# Patient Record
Sex: FEMALE | Race: BLACK OR AFRICAN AMERICAN | NOT HISPANIC OR LATINO | URBAN - METROPOLITAN AREA
[De-identification: names, ages, dates, MRNs, and addresses within clinical notes are randomized per-mention and may not be internally consistent; named-entity substitution may affect disease eponyms.]

---

## 2019-10-21 ENCOUNTER — OFFICE (OUTPATIENT)
Dept: URBAN - METROPOLITAN AREA CLINIC 11 | Facility: CLINIC | Age: 59
End: 2019-10-21

## 2019-10-21 VITALS
WEIGHT: 103 LBS | HEART RATE: 70 BPM | DIASTOLIC BLOOD PRESSURE: 79 MMHG | HEIGHT: 62 IN | SYSTOLIC BLOOD PRESSURE: 153 MMHG

## 2019-10-21 DIAGNOSIS — K80.50 CALCULUS OF BILE DUCT WITHOUT CHOLANGITIS OR CHOLECYSTITIS W: ICD-10-CM

## 2019-10-21 DIAGNOSIS — K83.9 DISEASE OF BILIARY TRACT, UNSPECIFIED: ICD-10-CM

## 2019-10-21 DIAGNOSIS — K83.1 OBSTRUCTION OF BILE DUCT: ICD-10-CM

## 2019-10-21 DIAGNOSIS — Z80.0 FAMILY HISTORY OF MALIGNANT NEOPLASM OF DIGESTIVE ORGANS: ICD-10-CM

## 2019-10-21 LAB
CBC, PLATELET, NO DIFFERENTIAL: HEMATOCRIT: 37.8 % (ref 34–46.6)
CBC, PLATELET, NO DIFFERENTIAL: HEMOGLOBIN: 11.8 G/DL (ref 11.1–15.9)
CBC, PLATELET, NO DIFFERENTIAL: MCH: 27.3 PG (ref 26.6–33)
CBC, PLATELET, NO DIFFERENTIAL: MCHC: 31.2 G/DL — LOW (ref 31.5–35.7)
CBC, PLATELET, NO DIFFERENTIAL: MCV: 87 FL (ref 79–97)
CBC, PLATELET, NO DIFFERENTIAL: PLATELETS: 221 X10E3/UL (ref 150–450)
CBC, PLATELET, NO DIFFERENTIAL: RBC: 4.33 X10E6/UL (ref 3.77–5.28)
CBC, PLATELET, NO DIFFERENTIAL: RDW: 14.4 % (ref 12.3–15.4)
CBC, PLATELET, NO DIFFERENTIAL: WBC: 5.9 X10E3/UL (ref 3.4–10.8)
COMP. METABOLIC PANEL (14): A/G RATIO: 2.1 (ref 1.2–2.2)
COMP. METABOLIC PANEL (14): ALBUMIN: 4.5 G/DL (ref 3.6–4.8)
COMP. METABOLIC PANEL (14): ALKALINE PHOSPHATASE: 86 IU/L (ref 39–117)
COMP. METABOLIC PANEL (14): ALT (SGPT): 9 IU/L (ref 0–32)
COMP. METABOLIC PANEL (14): AST (SGOT): 16 IU/L (ref 0–40)
COMP. METABOLIC PANEL (14): BILIRUBIN, TOTAL: <0.2 MG/DL
COMP. METABOLIC PANEL (14): BUN/CREATININE RATIO: 8 — LOW (ref 12–28)
COMP. METABOLIC PANEL (14): BUN: 7 MG/DL — LOW (ref 8–27)
COMP. METABOLIC PANEL (14): CALCIUM: 9.3 MG/DL (ref 8.7–10.3)
COMP. METABOLIC PANEL (14): CARBON DIOXIDE, TOTAL: 22 MMOL/L (ref 20–29)
COMP. METABOLIC PANEL (14): CHLORIDE: 107 MMOL/L — HIGH (ref 96–106)
COMP. METABOLIC PANEL (14): CREATININE: 0.89 MG/DL (ref 0.57–1)
COMP. METABOLIC PANEL (14): EGFR IF AFRICN AM: 81 ML/MIN/1.73 (ref 59–?)
COMP. METABOLIC PANEL (14): EGFR IF NONAFRICN AM: 70 ML/MIN/1.73 (ref 59–?)
COMP. METABOLIC PANEL (14): GLOBULIN, TOTAL: 2.1 G/DL (ref 1.5–4.5)
COMP. METABOLIC PANEL (14): GLUCOSE: 84 MG/DL (ref 65–99)
COMP. METABOLIC PANEL (14): POTASSIUM: 4.1 MMOL/L (ref 3.5–5.2)
COMP. METABOLIC PANEL (14): PROTEIN, TOTAL: 6.6 G/DL (ref 6–8.5)
COMP. METABOLIC PANEL (14): SODIUM: 142 MMOL/L (ref 134–144)
HEPATIC FUNCTION PANEL (7): BILIRUBIN, DIRECT: 0.06 MG/DL (ref 0–0.4)

## 2019-10-21 PROCEDURE — 99204 OFFICE O/P NEW MOD 45 MIN: CPT | Performed by: INTERNAL MEDICINE

## 2019-10-21 NOTE — SERVICEHPINOTES
Ms. Aly is a 59-year-old female referred to the clinic after having a CT performed due to hematuria. An incidental finding of the CT showed a dilated common bile duct with calcification in the distal duct with the recommendation of a GI consult. The patient denies any abdominal pain, unintentional weight loss, jaundice, lack of appetite, changes in bowel habits, nausea, vomiting, or changes in urine color. The patient states that she has actually gained 2 lbs since her last doctor visit and states that she always has been small stature in size. The patient states she feels no different than she has always felt, and has no complaints at this time. The patient had a colonoscopy in 2017, her mother had a history of colon cancer. the patient stated she was receptive to having an ERCP performed if deemed necessary.

## 2019-10-21 NOTE — SERVICENOTES
The patient was seen with my nurse practitioner today. The patient denies any abdominal pain, jaundice, fevers, or right upper quadrant pain. She does not have any symptoms to suggest cholangitis at this time.  I have reviewed the imaging report which does suggest significant dilation of the common bile duct at 15 millimeters with calcification located in the lumen of the duct.  I will discuss this with my associates perform ERCP but it does appear that ERCP is reasonable.  She denies any family history of biliary cancer or pancreatic cancer.

## 2019-11-22 ENCOUNTER — ON CAMPUS - OUTPATIENT (OUTPATIENT)
Dept: URBAN - METROPOLITAN AREA HOSPITAL 97 | Facility: HOSPITAL | Age: 59
End: 2019-11-22
Payer: MEDICAID

## 2019-11-22 DIAGNOSIS — K80.50 CALCULUS OF BILE DUCT WITHOUT CHOLANGITIS OR CHOLECYSTITIS W: ICD-10-CM

## 2019-11-22 PROCEDURE — 43264 ERCP REMOVE DUCT CALCULI: CPT | Performed by: INTERNAL MEDICINE

## 2019-11-22 PROCEDURE — 43262 ENDO CHOLANGIOPANCREATOGRAPH: CPT | Mod: 59 | Performed by: INTERNAL MEDICINE

## 2020-01-08 ENCOUNTER — ON CAMPUS - OUTPATIENT (OUTPATIENT)
Dept: URBAN - METROPOLITAN AREA HOSPITAL 97 | Facility: HOSPITAL | Age: 60
End: 2020-01-08
Payer: MEDICAID

## 2020-01-08 DIAGNOSIS — K80.50 CALCULUS OF BILE DUCT WITHOUT CHOLANGITIS OR CHOLECYSTITIS W: ICD-10-CM

## 2020-01-08 PROCEDURE — 43264 ERCP REMOVE DUCT CALCULI: CPT | Mod: 59 | Performed by: INTERNAL MEDICINE

## 2020-01-08 PROCEDURE — 43273 ENDOSCOPIC PANCREATOSCOPY: CPT | Performed by: INTERNAL MEDICINE

## 2020-01-08 PROCEDURE — 43275 ERCP REMOVE FORGN BODY DUCT: CPT | Performed by: INTERNAL MEDICINE

## 2020-01-27 ENCOUNTER — OFFICE (OUTPATIENT)
Dept: URBAN - METROPOLITAN AREA CLINIC 11 | Facility: CLINIC | Age: 60
End: 2020-01-27

## 2020-01-27 VITALS
HEART RATE: 68 BPM | DIASTOLIC BLOOD PRESSURE: 66 MMHG | WEIGHT: 103 LBS | SYSTOLIC BLOOD PRESSURE: 121 MMHG | HEIGHT: 62 IN

## 2020-01-27 DIAGNOSIS — K83.9 DISEASE OF BILIARY TRACT, UNSPECIFIED: ICD-10-CM

## 2020-01-27 DIAGNOSIS — K83.1 OBSTRUCTION OF BILE DUCT: ICD-10-CM

## 2020-01-27 LAB
HEPATIC FUNCTION PANEL (7): ALBUMIN: 4.6 G/DL (ref 3.8–4.8)
HEPATIC FUNCTION PANEL (7): ALKALINE PHOSPHATASE: 92 IU/L (ref 39–117)
HEPATIC FUNCTION PANEL (7): ALT (SGPT): 13 IU/L (ref 0–32)
HEPATIC FUNCTION PANEL (7): AST (SGOT): 22 IU/L (ref 0–40)
HEPATIC FUNCTION PANEL (7): BILIRUBIN, DIRECT: 0.07 MG/DL (ref 0–0.4)
HEPATIC FUNCTION PANEL (7): BILIRUBIN, TOTAL: <0.2 MG/DL
HEPATIC FUNCTION PANEL (7): PROTEIN, TOTAL: 7 G/DL (ref 6–8.5)

## 2020-01-27 PROCEDURE — 99213 OFFICE O/P EST LOW 20 MIN: CPT

## 2020-01-27 NOTE — SERVICENOTES
The patient was seen along with Nevaeh Khanna NP.  I have evaluated the patient, discussed with NP, and agree with the above history, impression, and plan.

  She is asymptomatic.  As such we will just recheck her liver enzymes today if liver enzymes are normal we will continue to monitor.  Given the extensive stone burden I would like to repeat liver labs in six months.  If she has worsening symptoms or if liver enzymes are elevated then she would need ultrasound for further evaluation.

## 2020-01-27 NOTE — SERVICEHPINOTES
Ms. Aly is a 59-year-old female referred to the clinic after having a CT performed due to hematuria. An incidental finding of the CT showed a dilated common bile duct with calcification in the distal duct with the recommendation of a GI consult. The patient denied any abdominal pain, unintentional weight loss, jaundice, lack of appetite, changes in bowel habits, nausea, vomiting, or changes in urine color. The patient states that she has actually gained 2 lbs since her last doctor visit and states that she always has been small stature in size. The patient states she feels no different than she has always felt, and has no complaints at this time. The patient had a colonoscopy in 2017, her mother had a history of colon cancer. the patient stated she was receptive to having an ERCP performed if deemed necessary.BRInterim History:  The patient had an MRI 10/30/2019 that indicated a dilated common bile duct full of stones. There was also mild left-sided intrahepatic duct dilation with stones within the left hepatic duct. The patient underwent an ERCP to 11/22/2019 that showed innumerable stones within the common bile duct and intrahepatic systems. A total of 40-50 stones removed there for a plastic biliary stent was placed. The patient reported that she was told a total of 56 tones were removed. The patient underwent another ERCP 01/08/2020 for stent removal and an additional 3 stones removed. Overall, the patient is doing very well. She states she has no abdominal pain, hematuria, jaundice, changes in stool or urine color, pruritus, or jaundice. The patient states that the only symptom she had prior to her ERCP with hematuria. The patient had a colonoscopy 10/2019 which was unremarkable.

## 2020-08-17 ENCOUNTER — OFFICE (OUTPATIENT)
Dept: URBAN - METROPOLITAN AREA CLINIC 11 | Facility: CLINIC | Age: 60
End: 2020-08-17
Payer: MEDICAID

## 2020-08-17 VITALS
WEIGHT: 107 LBS | OXYGEN SATURATION: 99 % | HEIGHT: 62 IN | SYSTOLIC BLOOD PRESSURE: 135 MMHG | HEART RATE: 77 BPM | DIASTOLIC BLOOD PRESSURE: 69 MMHG

## 2020-08-17 DIAGNOSIS — K59.00 CONSTIPATION, UNSPECIFIED: ICD-10-CM

## 2020-08-17 DIAGNOSIS — K83.9 DISEASE OF BILIARY TRACT, UNSPECIFIED: ICD-10-CM

## 2020-08-17 DIAGNOSIS — K83.1 OBSTRUCTION OF BILE DUCT: ICD-10-CM

## 2020-08-17 LAB
HEPATIC FUNCTION PANEL (7): ALBUMIN: 4.4 G/DL (ref 3.8–4.8)
HEPATIC FUNCTION PANEL (7): ALKALINE PHOSPHATASE: 86 IU/L (ref 39–117)
HEPATIC FUNCTION PANEL (7): ALT (SGPT): 13 IU/L (ref 0–32)
HEPATIC FUNCTION PANEL (7): AST (SGOT): 24 IU/L (ref 0–40)
HEPATIC FUNCTION PANEL (7): BILIRUBIN, DIRECT: 0.03 MG/DL (ref 0–0.4)
HEPATIC FUNCTION PANEL (7): BILIRUBIN, TOTAL: <0.2 MG/DL
HEPATIC FUNCTION PANEL (7): PROTEIN, TOTAL: 6.9 G/DL (ref 6–8.5)

## 2020-08-17 PROCEDURE — 99214 OFFICE O/P EST MOD 30 MIN: CPT
